# Patient Record
Sex: FEMALE | Race: BLACK OR AFRICAN AMERICAN | Employment: OTHER | URBAN - METROPOLITAN AREA
[De-identification: names, ages, dates, MRNs, and addresses within clinical notes are randomized per-mention and may not be internally consistent; named-entity substitution may affect disease eponyms.]

---

## 2020-09-25 ENCOUNTER — ESTABLISHED COMPREHENSIVE EXAM (OUTPATIENT)
Dept: URBAN - METROPOLITAN AREA CLINIC 42 | Facility: CLINIC | Age: 55
End: 2020-09-25

## 2020-09-25 DIAGNOSIS — H52.4: ICD-10-CM

## 2020-09-25 DIAGNOSIS — Z01.00: ICD-10-CM

## 2020-09-25 DIAGNOSIS — H52.13: ICD-10-CM

## 2020-09-25 DIAGNOSIS — H52.223: ICD-10-CM

## 2020-09-25 PROCEDURE — 92014 COMPRE OPH EXAM EST PT 1/>: CPT

## 2020-09-25 PROCEDURE — 92015 DETERMINE REFRACTIVE STATE: CPT

## 2020-09-25 ASSESSMENT — TONOMETRY
OS_IOP_MMHG: 17
OD_IOP_MMHG: 19

## 2020-09-25 ASSESSMENT — KERATOMETRY
OD_AXISANGLE2_DEGREES: 90
OD_AXISANGLE_DEGREES: 180
OD_K2POWER_DIOPTERS: 47
OS_K1POWER_DIOPTERS: 45
OS_K2POWER_DIOPTERS: 47
OD_K1POWER_DIOPTERS: 45
OS_AXISANGLE2_DEGREES: 83
OS_AXISANGLE_DEGREES: 173

## 2020-09-25 ASSESSMENT — VISUAL ACUITY
OD_SC: 20/70
OU_SC: 20/200
OU_SC: 20/40
OS_SC: 20/200
OS_SC: 20/50
OD_SC: 20/200

## 2021-11-04 NOTE — PATIENT DISCUSSION
I have discussed with the patient that the cause of tearing and pooling of tears may be due to tear duct being clogged or obstructed. Discussed options with patient of probing and irrigation versus following.  The risks, benefits, alternatives include anesthesia, bleeding, infection, inflammation. Patient understands and wishes to proceed with probing and irrigation to improve tearing.

## 2021-11-04 NOTE — PROCEDURE NOTE: CLINICAL
PROCEDURE NOTE: Probing Nasolacrimal Duct OU. Diagnosis: Epiphora Due to Excess Lacrimation. Anesthesia: Topical. Prior to treatment, the risks/benefits/alternatives were discussed. The patient wished to proceed with procedure. Patient tolerated procedure well. There were no complications. Post procedure instructions given. Ashley Pyle

## 2021-11-18 NOTE — PROCEDURE NOTE: CLINICAL
PROCEDURE NOTE: Punctoplasty OU. Diagnosis: Epiphora Due to Excess Lacrimation. Patient was given topical Tetracaine for anesthesia in both eyes and then pledgent with Lidocaine were placed in both eyes on the puncta . Having the patient look superiorly, the lid was retraced. I am going to use a pair of vannas scissors to snip the punta to open the tear duct so tears can flow correctly. The same procedure was performed on left canalicular system. Patient tolerated the procedure without difficulty. Alice Webb

## 2021-11-18 NOTE — PATIENT DISCUSSION
I have discussed with the patient that the cause of tearing and pooling of tears may be due to tear duct being clogged or obstructed. Discussed options with patient of punctal plasty versus following.  The risks, benefits, alternatives include anesthesia, bleeding, infection, inflammation. The patient understands and wishes to proceed with punctal plasty to improve tearing. An Rx was given for Maxitrol 1 drop 3 times a day for one week, then 2 times a day for one week.

## 2021-12-07 NOTE — PATIENT DISCUSSION
Discussed condition and exacerbating conditions/situations (e.g., dry/arid environments, overhead fans, air conditioners, side effect of medications). gave patient sample of refresh reliva  to improve tearing symptoms .

## 2022-02-07 NOTE — PATIENT DISCUSSION
Patient only using drops bid. She is to use them every hour for today then go every other hour for a few days. Then as many times as needed for 2 weeks. Consider plugs at next visit. Consider ectropion repair.

## 2022-02-24 NOTE — PATIENT DISCUSSION
Patient wishes to only use tears for now and not have plugs. 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 Continue to use artificial tears many times per day.